# Patient Record
Sex: FEMALE | Race: WHITE | NOT HISPANIC OR LATINO | URBAN - NONMETROPOLITAN AREA
[De-identification: names, ages, dates, MRNs, and addresses within clinical notes are randomized per-mention and may not be internally consistent; named-entity substitution may affect disease eponyms.]

---

## 2020-10-16 ENCOUNTER — OFFICE VISIT (OUTPATIENT)
Dept: URBAN - NONMETROPOLITAN AREA CLINIC 13 | Facility: CLINIC | Age: 30
End: 2020-10-16
Payer: COMMERCIAL

## 2020-10-16 ENCOUNTER — LAB OUTSIDE AN ENCOUNTER (OUTPATIENT)
Dept: URBAN - NONMETROPOLITAN AREA CLINIC 13 | Facility: CLINIC | Age: 30
End: 2020-10-16

## 2020-10-16 VITALS
HEART RATE: 120 BPM | HEIGHT: 64 IN | WEIGHT: 200 LBS | DIASTOLIC BLOOD PRESSURE: 83 MMHG | TEMPERATURE: 97.5 F | SYSTOLIC BLOOD PRESSURE: 115 MMHG | BODY MASS INDEX: 34.15 KG/M2

## 2020-10-16 DIAGNOSIS — K59.01 CONSTIPATION: ICD-10-CM

## 2020-10-16 DIAGNOSIS — K21.9 GERD: ICD-10-CM

## 2020-10-16 PROCEDURE — 99244 OFF/OP CNSLTJ NEW/EST MOD 40: CPT | Performed by: INTERNAL MEDICINE

## 2020-10-16 PROCEDURE — G8482 FLU IMMUNIZE ORDER/ADMIN: HCPCS | Performed by: INTERNAL MEDICINE

## 2020-10-16 RX ORDER — PRUCALOPRIDE 2 MG/1
1 TABLET TABLET, FILM COATED ORAL ONCE A DAY
Qty: 90 TABLET | Refills: 3 | OUTPATIENT
Start: 2020-10-16 | End: 2021-10-11

## 2020-10-16 RX ORDER — BACLOFEN 10 MG/1
1 TABLET WITH FOOD OR MILK TABLET ORAL THREE TIMES A DAY
Status: ACTIVE | COMMUNITY

## 2020-10-16 RX ORDER — ETONOGESTREL AND ETHINYL ESTRADIOL .12; .015 MG/D; MG/D
1 RING LEAVE IN PLACE FOR 3 WEEKS, REMOVE, AND REPLACE WITH A NEW RING AFTER 7 DAY BREAK INSERT, EXTENDED RELEASE VAGINAL
Status: ACTIVE | COMMUNITY

## 2020-10-16 RX ORDER — CHOLECALCIFEROL (VITAMIN D3) 125 MCG
AS DIRECTED CAPSULE ORAL
Status: ACTIVE | COMMUNITY

## 2020-10-16 RX ORDER — LINACLOTIDE 145 UG/1
1 CAPSULE AT LEAST 30 MINUTES BEFORE THE FIRST MEAL OF THE DAY ON AN EMPTY STOMACH CAPSULE, GELATIN COATED ORAL ONCE A DAY
Status: ACTIVE | COMMUNITY

## 2020-10-16 RX ORDER — SODIUM, POTASSIUM,MAG SULFATES 17.5-3.13G
354 ML AS DIRECTED SOLUTION, RECONSTITUTED, ORAL ORAL ONCE
Qty: 354 MILLILITER | Refills: 0 | OUTPATIENT
Start: 2020-10-16 | End: 2020-10-17

## 2020-10-16 RX ORDER — PANTOPRAZOLE SODIUM 40 MG/1
1 TABLET TABLET, DELAYED RELEASE ORAL ONCE A DAY
Status: ACTIVE | COMMUNITY

## 2020-10-16 RX ORDER — SERTRALINE 50 MG/1
1 TABLET TABLET, FILM COATED ORAL ONCE A DAY
Status: ACTIVE | COMMUNITY

## 2020-10-16 RX ORDER — DEXTROAMPHETAMINE SACCHARATE, AMPHETAMINE ASPARTATE, DEXTROAMPHETAMINE SULFATE, AND AMPHETAMINE SULFATE 7.5; 7.5; 7.5; 7.5 MG/1; MG/1; MG/1; MG/1
1 TABLET TABLET ORAL TWICE A DAY
Status: ACTIVE | COMMUNITY

## 2020-10-16 NOTE — HPI-TODAY'S VISIT:
Adina is a 30-year-old female who presents for evaluation of chronic constipation.  She has had symptoms for some time now.  She has been on Adderall since her youth and recently on baclofen for leg cramps.  Over the past year she has been on Linzess 145 mcg daily.  She has up to 3-10 bowel movements with incomplete evacuation along with tenesmus.  She denies any mucus or nocturnal symptoms, she does have occasional bright red blood per rectum.  She denies any weight loss.  She is recently diagnosed with Covid and has been taking ibuprofen.  She has had increased reflux and heartburn recently.  Today her main complaint is constipation along with tenesmus.  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

## 2020-10-22 LAB
H. PYLORI STOOL AG, EIA: (no result)
H. PYLORI, IGG ABS: 0.1
REQUEST PROBLEM: (no result)

## 2020-11-11 ENCOUNTER — TELEPHONE ENCOUNTER (OUTPATIENT)
Dept: URBAN - NONMETROPOLITAN AREA CLINIC 2 | Facility: CLINIC | Age: 30
End: 2020-11-11

## 2020-11-11 RX ORDER — SODIUM, POTASSIUM,MAG SULFATES 17.5-3.13G
354 ML AS DIRECTED SOLUTION, RECONSTITUTED, ORAL ORAL ONCE
Qty: 354 MILLILITER | Refills: 0 | OUTPATIENT
Start: 2020-11-11 | End: 2020-11-12

## 2020-11-11 RX ORDER — PRUCALOPRIDE 2 MG/1
1 TABLET TABLET, FILM COATED ORAL ONCE A DAY
Qty: 90 TABLET | Refills: 3 | Status: ACTIVE | COMMUNITY
Start: 2020-10-16 | End: 2021-10-11

## 2020-11-11 RX ORDER — DEXTROAMPHETAMINE SACCHARATE, AMPHETAMINE ASPARTATE, DEXTROAMPHETAMINE SULFATE, AND AMPHETAMINE SULFATE 7.5; 7.5; 7.5; 7.5 MG/1; MG/1; MG/1; MG/1
1 TABLET TABLET ORAL TWICE A DAY
Status: ACTIVE | COMMUNITY

## 2020-11-11 RX ORDER — LINACLOTIDE 145 UG/1
1 CAPSULE AT LEAST 30 MINUTES BEFORE THE FIRST MEAL OF THE DAY ON AN EMPTY STOMACH CAPSULE, GELATIN COATED ORAL ONCE A DAY
Status: ACTIVE | COMMUNITY

## 2020-11-11 RX ORDER — PANTOPRAZOLE SODIUM 40 MG/1
1 TABLET TABLET, DELAYED RELEASE ORAL ONCE A DAY
Status: ACTIVE | COMMUNITY

## 2020-11-11 RX ORDER — BACLOFEN 10 MG/1
1 TABLET WITH FOOD OR MILK TABLET ORAL THREE TIMES A DAY
Status: ACTIVE | COMMUNITY

## 2020-11-11 RX ORDER — ETONOGESTREL AND ETHINYL ESTRADIOL .12; .015 MG/D; MG/D
1 RING LEAVE IN PLACE FOR 3 WEEKS, REMOVE, AND REPLACE WITH A NEW RING AFTER 7 DAY BREAK INSERT, EXTENDED RELEASE VAGINAL
Status: ACTIVE | COMMUNITY

## 2020-11-11 RX ORDER — SERTRALINE 50 MG/1
1 TABLET TABLET, FILM COATED ORAL ONCE A DAY
Status: ACTIVE | COMMUNITY

## 2020-11-11 RX ORDER — CHOLECALCIFEROL (VITAMIN D3) 125 MCG
AS DIRECTED CAPSULE ORAL
Status: ACTIVE | COMMUNITY

## 2020-11-16 ENCOUNTER — OFFICE VISIT (OUTPATIENT)
Dept: URBAN - NONMETROPOLITAN AREA SURGERY CENTER 1 | Facility: SURGERY CENTER | Age: 30
End: 2020-11-16
Payer: COMMERCIAL

## 2020-11-16 DIAGNOSIS — K59.09 CHRONIC CONSTIPATION: ICD-10-CM

## 2020-11-16 PROCEDURE — G8907 PT DOC NO EVENTS ON DISCHARG: HCPCS | Performed by: INTERNAL MEDICINE

## 2020-11-16 PROCEDURE — 45378 DIAGNOSTIC COLONOSCOPY: CPT | Performed by: INTERNAL MEDICINE

## 2020-11-16 PROCEDURE — G9937 DIG OR SURV COLSCO: HCPCS | Performed by: INTERNAL MEDICINE

## 2020-12-18 ENCOUNTER — OFFICE VISIT (OUTPATIENT)
Dept: URBAN - NONMETROPOLITAN AREA CLINIC 13 | Facility: CLINIC | Age: 30
End: 2020-12-18
Payer: COMMERCIAL

## 2020-12-18 ENCOUNTER — OFFICE VISIT (OUTPATIENT)
Dept: URBAN - NONMETROPOLITAN AREA CLINIC 13 | Facility: CLINIC | Age: 30
End: 2020-12-18

## 2020-12-18 ENCOUNTER — WEB ENCOUNTER (OUTPATIENT)
Dept: URBAN - NONMETROPOLITAN AREA CLINIC 13 | Facility: CLINIC | Age: 30
End: 2020-12-18

## 2020-12-18 VITALS
BODY MASS INDEX: 34.15 KG/M2 | TEMPERATURE: 98 F | WEIGHT: 200 LBS | HEART RATE: 97 BPM | HEIGHT: 64 IN | DIASTOLIC BLOOD PRESSURE: 72 MMHG | SYSTOLIC BLOOD PRESSURE: 128 MMHG

## 2020-12-18 DIAGNOSIS — K59.01 CONSTIPATION: ICD-10-CM

## 2020-12-18 DIAGNOSIS — K21.9 GERD: ICD-10-CM

## 2020-12-18 DIAGNOSIS — Z12.11 COLON CANCER SCREENING: ICD-10-CM

## 2020-12-18 PROCEDURE — 1036F TOBACCO NON-USER: CPT | Performed by: INTERNAL MEDICINE

## 2020-12-18 PROCEDURE — G8482 FLU IMMUNIZE ORDER/ADMIN: HCPCS | Performed by: INTERNAL MEDICINE

## 2020-12-18 PROCEDURE — G9903 PT SCRN TBCO ID AS NON USER: HCPCS | Performed by: INTERNAL MEDICINE

## 2020-12-18 PROCEDURE — G8417 CALC BMI ABV UP PARAM F/U: HCPCS | Performed by: INTERNAL MEDICINE

## 2020-12-18 PROCEDURE — 99213 OFFICE O/P EST LOW 20 MIN: CPT | Performed by: INTERNAL MEDICINE

## 2020-12-18 PROCEDURE — G8427 DOCREV CUR MEDS BY ELIG CLIN: HCPCS | Performed by: INTERNAL MEDICINE

## 2020-12-18 RX ORDER — PANTOPRAZOLE SODIUM 40 MG/1
1 TABLET TABLET, DELAYED RELEASE ORAL ONCE A DAY
Status: ACTIVE | COMMUNITY

## 2020-12-18 RX ORDER — ETONOGESTREL AND ETHINYL ESTRADIOL .12; .015 MG/D; MG/D
1 RING LEAVE IN PLACE FOR 3 WEEKS, REMOVE, AND REPLACE WITH A NEW RING AFTER 7 DAY BREAK INSERT, EXTENDED RELEASE VAGINAL
Status: ACTIVE | COMMUNITY

## 2020-12-18 RX ORDER — DEXTROAMPHETAMINE SACCHARATE, AMPHETAMINE ASPARTATE, DEXTROAMPHETAMINE SULFATE, AND AMPHETAMINE SULFATE 7.5; 7.5; 7.5; 7.5 MG/1; MG/1; MG/1; MG/1
1 TABLET TABLET ORAL TWICE A DAY
Status: ACTIVE | COMMUNITY

## 2020-12-18 RX ORDER — CHOLECALCIFEROL (VITAMIN D3) 125 MCG
AS DIRECTED CAPSULE ORAL
Status: ACTIVE | COMMUNITY

## 2020-12-18 RX ORDER — SERTRALINE 50 MG/1
1 TABLET TABLET, FILM COATED ORAL ONCE A DAY
Status: ACTIVE | COMMUNITY

## 2020-12-18 RX ORDER — BACLOFEN 10 MG/1
1 TABLET WITH FOOD OR MILK TABLET ORAL THREE TIMES A DAY
Status: ACTIVE | COMMUNITY

## 2020-12-18 RX ORDER — PRUCALOPRIDE 2 MG/1
1 TABLET TABLET, FILM COATED ORAL ONCE A DAY
Qty: 90 TABLET | Refills: 3 | Status: ACTIVE | COMMUNITY
Start: 2020-10-16 | End: 2021-10-11

## 2020-12-18 RX ORDER — LINACLOTIDE 145 UG/1
1 CAPSULE AT LEAST 30 MINUTES BEFORE THE FIRST MEAL OF THE DAY ON AN EMPTY STOMACH CAPSULE, GELATIN COATED ORAL ONCE A DAY
Status: ACTIVE | COMMUNITY

## 2020-12-18 NOTE — HPI-TODAY'S VISIT:
Adina is a 30-year-old female who presents for evaluation of chronic constipation.  She has had symptoms for some time now.  She has been on Adderall since her youth and recently on baclofen for leg cramps.  Over the past year she has been on Linzess 145 mcg daily.  She has up to 3-10 bowel movements with incomplete evacuation along with tenesmus.  She denies any mucus or nocturnal symptoms, she does have occasional bright red blood per rectum.  She denies any weight loss.  She is recently diagnosed with Covid and has been taking ibuprofen.  She has had increased reflux and heartburn recently.  Today her main complaint is constipation along with tenesmus.  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 Since her last visit her colonoscopy was normal. She can't afford the Motegrity it is over 200$ a month. Linzess 145mcg gave her diarrhea. She would like to try the amitizia. MB

## 2021-01-15 ENCOUNTER — TELEPHONE ENCOUNTER (OUTPATIENT)
Dept: URBAN - METROPOLITAN AREA CLINIC 115 | Facility: CLINIC | Age: 31
End: 2021-01-15

## 2021-01-15 RX ORDER — BACLOFEN 10 MG/1
1 TABLET WITH FOOD OR MILK TABLET ORAL THREE TIMES A DAY
Status: ACTIVE | COMMUNITY

## 2021-01-15 RX ORDER — LINACLOTIDE 145 UG/1
1 CAPSULE AT LEAST 30 MINUTES BEFORE THE FIRST MEAL OF THE DAY ON AN EMPTY STOMACH CAPSULE, GELATIN COATED ORAL ONCE A DAY
Status: ACTIVE | COMMUNITY

## 2021-01-15 RX ORDER — SERTRALINE 50 MG/1
1 TABLET TABLET, FILM COATED ORAL ONCE A DAY
Status: ACTIVE | COMMUNITY

## 2021-01-15 RX ORDER — ETONOGESTREL AND ETHINYL ESTRADIOL .12; .015 MG/D; MG/D
1 RING LEAVE IN PLACE FOR 3 WEEKS, REMOVE, AND REPLACE WITH A NEW RING AFTER 7 DAY BREAK INSERT, EXTENDED RELEASE VAGINAL
Status: ACTIVE | COMMUNITY

## 2021-01-15 RX ORDER — CHOLECALCIFEROL (VITAMIN D3) 125 MCG
AS DIRECTED CAPSULE ORAL
Status: ACTIVE | COMMUNITY

## 2021-01-15 RX ORDER — PANTOPRAZOLE SODIUM 40 MG/1
1 TABLET TABLET, DELAYED RELEASE ORAL ONCE A DAY
Status: ACTIVE | COMMUNITY

## 2021-01-15 RX ORDER — PRUCALOPRIDE 2 MG/1
1 TABLET TABLET, FILM COATED ORAL ONCE A DAY
Qty: 90 TABLET | Refills: 3 | Status: ACTIVE | COMMUNITY
Start: 2020-10-16 | End: 2021-10-11

## 2021-01-15 RX ORDER — LUBIPROSTONE 24 UG/1
TAKE 1 CAPSULE (8 MCG) BY ORAL ROUTE 2 TIMES PER DAY WITH FOOD AND WATER CAPSULE, GELATIN COATED ORAL 2
Qty: 180 CAPSULES | Refills: 3 | OUTPATIENT
Start: 2021-01-15 | End: 2022-01-10

## 2021-01-15 RX ORDER — DEXTROAMPHETAMINE SACCHARATE, AMPHETAMINE ASPARTATE, DEXTROAMPHETAMINE SULFATE, AND AMPHETAMINE SULFATE 7.5; 7.5; 7.5; 7.5 MG/1; MG/1; MG/1; MG/1
1 TABLET TABLET ORAL TWICE A DAY
Status: ACTIVE | COMMUNITY

## 2021-03-19 ENCOUNTER — OFFICE VISIT (OUTPATIENT)
Dept: URBAN - NONMETROPOLITAN AREA CLINIC 13 | Facility: CLINIC | Age: 31
End: 2021-03-19
Payer: COMMERCIAL

## 2021-03-19 DIAGNOSIS — K59.01 CONSTIPATION: ICD-10-CM

## 2021-03-19 DIAGNOSIS — Z12.11 COLON CANCER SCREENING: ICD-10-CM

## 2021-03-19 DIAGNOSIS — K21.9 GERD: ICD-10-CM

## 2021-03-19 PROCEDURE — 99213 OFFICE O/P EST LOW 20 MIN: CPT | Performed by: NURSE PRACTITIONER

## 2021-03-19 RX ORDER — CHOLECALCIFEROL (VITAMIN D3) 125 MCG
AS DIRECTED CAPSULE ORAL
Status: ACTIVE | COMMUNITY

## 2021-03-19 RX ORDER — LINACLOTIDE 145 UG/1
1 CAPSULE AT LEAST 30 MINUTES BEFORE THE FIRST MEAL OF THE DAY ON AN EMPTY STOMACH CAPSULE, GELATIN COATED ORAL ONCE A DAY
Status: ACTIVE | COMMUNITY

## 2021-03-19 RX ORDER — ETONOGESTREL AND ETHINYL ESTRADIOL .12; .015 MG/D; MG/D
1 RING LEAVE IN PLACE FOR 3 WEEKS, REMOVE, AND REPLACE WITH A NEW RING AFTER 7 DAY BREAK INSERT, EXTENDED RELEASE VAGINAL
Status: ACTIVE | COMMUNITY

## 2021-03-19 RX ORDER — SERTRALINE 50 MG/1
1 TABLET TABLET, FILM COATED ORAL ONCE A DAY
Status: ACTIVE | COMMUNITY

## 2021-03-19 RX ORDER — BACLOFEN 10 MG/1
1 TABLET WITH FOOD OR MILK TABLET ORAL THREE TIMES A DAY
Status: ACTIVE | COMMUNITY

## 2021-03-19 RX ORDER — PRUCALOPRIDE 2 MG/1
1 TABLET TABLET, FILM COATED ORAL ONCE A DAY
Qty: 90 TABLET | Refills: 3 | Status: ACTIVE | COMMUNITY
Start: 2020-10-16 | End: 2021-10-11

## 2021-03-19 RX ORDER — PANTOPRAZOLE SODIUM 40 MG/1
1 TABLET TABLET, DELAYED RELEASE ORAL ONCE A DAY
Status: ACTIVE | COMMUNITY

## 2021-03-19 RX ORDER — LUBIPROSTONE 24 UG/1
TAKE 1 CAPSULE (8 MCG) BY ORAL ROUTE 2 TIMES PER DAY WITH FOOD AND WATER CAPSULE, GELATIN COATED ORAL 2
Qty: 180 CAPSULES | Refills: 3 | Status: ACTIVE | COMMUNITY
Start: 2021-01-15 | End: 2022-01-10

## 2021-03-19 RX ORDER — DEXTROAMPHETAMINE SACCHARATE, AMPHETAMINE ASPARTATE, DEXTROAMPHETAMINE SULFATE, AND AMPHETAMINE SULFATE 7.5; 7.5; 7.5; 7.5 MG/1; MG/1; MG/1; MG/1
1 TABLET TABLET ORAL TWICE A DAY
Status: ACTIVE | COMMUNITY

## 2021-03-19 NOTE — HPI-TODAY'S VISIT:
Adina is a 30-year-old female who presents for evaluation of chronic constipation.  She has had symptoms for some time now.  She has been on Adderall since her youth and recently on baclofen for leg cramps.  Over the past year she has been on Linzess 145 mcg daily.  She has up to 3-10 bowel movements with incomplete evacuation along with tenesmus.  She denies any mucus or nocturnal symptoms, she does have occasional bright red blood per rectum.  She denies any weight loss.  She is recently diagnosed with Covid and has been taking ibuprofen.  She has had increased reflux and heartburn recently.  Today her main complaint is constipation along with tenesmus.  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 Since her last visit her colonoscopy was normal. She can't afford the Motegrity it is over 200$ a month. Linzess 145mcg gave her diarrhea. She would like to try the amitizia. MB  3/19/2021 Adina presents for follow-up of constipation.  Since her last visit she started Amitiza 8 mcg twice daily.  She is having a good bowel movement daily.  She still has some abdominal cramping and is following with her gynecologist with likely endometriosis as well.  Today we had a long discussion regarding endometriosis and functional bowel disease.  She likely has symptoms of both.  I have recommended IBgard as needed for spasm.  Today she is doing well otherwise.  MB

## 2021-06-22 ENCOUNTER — WEB ENCOUNTER (OUTPATIENT)
Dept: URBAN - NONMETROPOLITAN AREA CLINIC 13 | Facility: CLINIC | Age: 31
End: 2021-06-22

## 2021-06-24 ENCOUNTER — OFFICE VISIT (OUTPATIENT)
Dept: URBAN - NONMETROPOLITAN AREA CLINIC 13 | Facility: CLINIC | Age: 31
End: 2021-06-24
Payer: COMMERCIAL

## 2021-06-24 ENCOUNTER — WEB ENCOUNTER (OUTPATIENT)
Dept: URBAN - NONMETROPOLITAN AREA CLINIC 13 | Facility: CLINIC | Age: 31
End: 2021-06-24

## 2021-06-24 VITALS
DIASTOLIC BLOOD PRESSURE: 86 MMHG | HEIGHT: 64 IN | BODY MASS INDEX: 33.6 KG/M2 | TEMPERATURE: 97.7 F | HEART RATE: 97 BPM | WEIGHT: 196.8 LBS | SYSTOLIC BLOOD PRESSURE: 127 MMHG

## 2021-06-24 DIAGNOSIS — K21.9 GERD: ICD-10-CM

## 2021-06-24 DIAGNOSIS — K59.01 CONSTIPATION: ICD-10-CM

## 2021-06-24 DIAGNOSIS — Z12.11 COLON CANCER SCREENING: ICD-10-CM

## 2021-06-24 DIAGNOSIS — R10.32 LLQ ABDOMINAL PAIN: ICD-10-CM

## 2021-06-24 PROCEDURE — 99214 OFFICE O/P EST MOD 30 MIN: CPT | Performed by: INTERNAL MEDICINE

## 2021-06-24 RX ORDER — CHOLECALCIFEROL (VITAMIN D3) 125 MCG
AS DIRECTED CAPSULE ORAL
Status: ACTIVE | COMMUNITY

## 2021-06-24 RX ORDER — PANTOPRAZOLE SODIUM 40 MG/1
1 TABLET TABLET, DELAYED RELEASE ORAL ONCE A DAY
Status: ACTIVE | COMMUNITY

## 2021-06-24 RX ORDER — LUBIPROSTONE 24 UG/1
TAKE 1 CAPSULE (8 MCG) BY ORAL ROUTE 2 TIMES PER DAY WITH FOOD AND WATER CAPSULE, GELATIN COATED ORAL 2
Qty: 180 CAPSULES | Refills: 3 | Status: ACTIVE | COMMUNITY
Start: 2021-01-15 | End: 2022-01-10

## 2021-06-24 RX ORDER — LINACLOTIDE 145 UG/1
1 CAPSULE AT LEAST 30 MINUTES BEFORE THE FIRST MEAL OF THE DAY ON AN EMPTY STOMACH CAPSULE, GELATIN COATED ORAL ONCE A DAY
Status: ACTIVE | COMMUNITY

## 2021-06-24 RX ORDER — PRUCALOPRIDE 2 MG/1
1 TABLET TABLET, FILM COATED ORAL ONCE A DAY
Qty: 90 TABLET | Refills: 3 | Status: ACTIVE | COMMUNITY
Start: 2020-10-16 | End: 2021-10-11

## 2021-06-24 RX ORDER — SERTRALINE 50 MG/1
1 TABLET TABLET, FILM COATED ORAL ONCE A DAY
Status: ACTIVE | COMMUNITY

## 2021-06-24 RX ORDER — BACLOFEN 10 MG/1
1 TABLET WITH FOOD OR MILK TABLET ORAL THREE TIMES A DAY
Status: ACTIVE | COMMUNITY

## 2021-06-24 RX ORDER — ETONOGESTREL AND ETHINYL ESTRADIOL .12; .015 MG/D; MG/D
1 RING LEAVE IN PLACE FOR 3 WEEKS, REMOVE, AND REPLACE WITH A NEW RING AFTER 7 DAY BREAK INSERT, EXTENDED RELEASE VAGINAL
Status: ACTIVE | COMMUNITY

## 2021-06-24 RX ORDER — DEXTROAMPHETAMINE SACCHARATE, AMPHETAMINE ASPARTATE, DEXTROAMPHETAMINE SULFATE, AND AMPHETAMINE SULFATE 7.5; 7.5; 7.5; 7.5 MG/1; MG/1; MG/1; MG/1
1 TABLET TABLET ORAL TWICE A DAY
Status: ACTIVE | COMMUNITY

## 2021-06-24 NOTE — HPI-TODAY'S VISIT:
Adina is a 30-year-old female who presents for evaluation of chronic constipation.  She has had symptoms for some time now.  She has been on Adderall since her youth and recently on baclofen for leg cramps.  Over the past year she has been on Linzess 145 mcg daily.  She has up to 3-10 bowel movements with incomplete evacuation along with tenesmus.  She denies any mucus or nocturnal symptoms, she does have occasional bright red blood per rectum.  She denies any weight loss.  She is recently diagnosed with Covid and has been taking ibuprofen.  She has had increased reflux and heartburn recently.  Today her main complaint is constipation along with tenesmus.  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 Since her last visit her colonoscopy was normal. She can't afford the Motegrity it is over 200$ a month. Linzess 145mcg gave her diarrhea. She would like to try the amitizia. MB  3/19/2021 Adina presents for follow-up of constipation.  Since her last visit she started Amitiza 8 mcg twice daily.  She is having a good bowel movement daily.  She still has some abdominal cramping and is following with her gynecologist with likely endometriosis as well.  Today we had a long discussion regarding endometriosis and functional bowel disease.  She likely has symptoms of both.  I have recommended IBgard as needed for spasm.  Today she is doing well otherwise.  MB 6/24/2021 The patient presents today for follow-up of her constipation and abdominal pain.  Since her last visit, her abdominal pain has become significantly worse.  She states she is going to the bathroom all morning long.  She has cramping abdominal pain with the Amitiza.  She is also been seen by her OB/GYN, and her hormones have been changed due to her endometriosis.  She was referred back here to see if we could change her bowel regimen to help with her abdominal pain.  We have had a long discussion about stopping the Amitiza and starting MiraLAX and Metamucil.  She states she is having clear watery bowel movements 10-12 times a day.  She continues to have take pain whether or not she takes Amitiza or not.  She is unsure if the constipation is contributing to her pain or if it is endometriosis.  At this point, we have discussed changing her bowel regimen, and if symptoms do not improve, then proceeding with further imaging.  The patient is in agreement with this plan.

## 2021-07-07 ENCOUNTER — WEB ENCOUNTER (OUTPATIENT)
Dept: URBAN - NONMETROPOLITAN AREA CLINIC 13 | Facility: CLINIC | Age: 31
End: 2021-07-07

## 2021-07-07 ENCOUNTER — LAB OUTSIDE AN ENCOUNTER (OUTPATIENT)
Dept: URBAN - NONMETROPOLITAN AREA CLINIC 13 | Facility: CLINIC | Age: 31
End: 2021-07-07

## 2021-07-13 ENCOUNTER — WEB ENCOUNTER (OUTPATIENT)
Dept: URBAN - NONMETROPOLITAN AREA CLINIC 2 | Facility: CLINIC | Age: 31
End: 2021-07-13

## 2021-07-13 ENCOUNTER — DASHBOARD ENCOUNTERS (OUTPATIENT)
Age: 31
End: 2021-07-13

## 2021-07-13 ENCOUNTER — OFFICE VISIT (OUTPATIENT)
Dept: URBAN - NONMETROPOLITAN AREA CLINIC 2 | Facility: CLINIC | Age: 31
End: 2021-07-13
Payer: COMMERCIAL

## 2021-07-13 VITALS
TEMPERATURE: 97.8 F | HEIGHT: 64 IN | SYSTOLIC BLOOD PRESSURE: 124 MMHG | HEART RATE: 86 BPM | WEIGHT: 200 LBS | BODY MASS INDEX: 34.15 KG/M2 | DIASTOLIC BLOOD PRESSURE: 86 MMHG

## 2021-07-13 DIAGNOSIS — K21.9 GERD: ICD-10-CM

## 2021-07-13 DIAGNOSIS — K58.2 IRRITABLE BOWEL SYNDROME WITH BOTH CONSTIPATION AND DIARRHEA: ICD-10-CM

## 2021-07-13 DIAGNOSIS — Z12.11 COLON CANCER SCREENING: ICD-10-CM

## 2021-07-13 DIAGNOSIS — R10.32 LLQ ABDOMINAL PAIN: ICD-10-CM

## 2021-07-13 PROBLEM — 235595009 GASTROESOPHAGEAL REFLUX DISEASE: Status: ACTIVE | Noted: 2020-10-16

## 2021-07-13 PROBLEM — 301716002: Status: ACTIVE | Noted: 2021-06-24

## 2021-07-13 PROBLEM — 10743008: Status: ACTIVE | Noted: 2021-07-13

## 2021-07-13 PROBLEM — 275978004 COLON CANCER SCREENING: Status: ACTIVE | Noted: 2020-12-18

## 2021-07-13 PROCEDURE — 99214 OFFICE O/P EST MOD 30 MIN: CPT | Performed by: INTERNAL MEDICINE

## 2021-07-13 RX ORDER — PRUCALOPRIDE 2 MG/1
1 TABLET TABLET, FILM COATED ORAL ONCE A DAY
Qty: 90 TABLET | Refills: 3 | Status: ON HOLD | COMMUNITY
Start: 2020-10-16 | End: 2021-10-11

## 2021-07-13 RX ORDER — RIFAXIMIN 550 MG/1
1 TABLET TABLET ORAL THREE TIMES A DAY
Qty: 42 TABLET | Refills: 2 | OUTPATIENT
Start: 2021-07-13 | End: 2021-08-24

## 2021-07-13 RX ORDER — DEXTROAMPHETAMINE SACCHARATE, AMPHETAMINE ASPARTATE, DEXTROAMPHETAMINE SULFATE, AND AMPHETAMINE SULFATE 7.5; 7.5; 7.5; 7.5 MG/1; MG/1; MG/1; MG/1
1 TABLET TABLET ORAL TWICE A DAY
Status: ACTIVE | COMMUNITY

## 2021-07-13 RX ORDER — BACLOFEN 10 MG/1
1 TABLET WITH FOOD OR MILK TABLET ORAL THREE TIMES A DAY
Status: ACTIVE | COMMUNITY

## 2021-07-13 RX ORDER — SERTRALINE 50 MG/1
1 TABLET TABLET, FILM COATED ORAL ONCE A DAY
Status: ACTIVE | COMMUNITY

## 2021-07-13 RX ORDER — LINACLOTIDE 145 UG/1
1 CAPSULE AT LEAST 30 MINUTES BEFORE THE FIRST MEAL OF THE DAY ON AN EMPTY STOMACH CAPSULE, GELATIN COATED ORAL ONCE A DAY
Status: ON HOLD | COMMUNITY

## 2021-07-13 RX ORDER — CHOLECALCIFEROL (VITAMIN D3) 125 MCG
AS DIRECTED CAPSULE ORAL
Status: ACTIVE | COMMUNITY

## 2021-07-13 RX ORDER — PANTOPRAZOLE SODIUM 40 MG/1
1 TABLET TABLET, DELAYED RELEASE ORAL ONCE A DAY
Status: ACTIVE | COMMUNITY

## 2021-07-13 RX ORDER — LUBIPROSTONE 24 UG/1
TAKE 1 CAPSULE (8 MCG) BY ORAL ROUTE 2 TIMES PER DAY WITH FOOD AND WATER CAPSULE, GELATIN COATED ORAL 2
Qty: 180 CAPSULES | Refills: 3 | Status: ON HOLD | COMMUNITY
Start: 2021-01-15 | End: 2022-01-10

## 2021-07-13 RX ORDER — ETONOGESTREL AND ETHINYL ESTRADIOL .12; .015 MG/D; MG/D
1 RING LEAVE IN PLACE FOR 3 WEEKS, REMOVE, AND REPLACE WITH A NEW RING AFTER 7 DAY BREAK INSERT, EXTENDED RELEASE VAGINAL
Status: ACTIVE | COMMUNITY

## 2021-07-13 NOTE — HPI-TODAY'S VISIT:
Adina is a 30-year-old female who presents for evaluation of chronic constipation.  She has had symptoms for some time now.  She has been on Adderall since her youth and recently on baclofen for leg cramps.  Over the past year she has been on Linzess 145 mcg daily.  She has up to 3-10 bowel movements with incomplete evacuation along with tenesmus.  She denies any mucus or nocturnal symptoms, she does have occasional bright red blood per rectum.  She denies any weight loss.  She is recently diagnosed with Covid and has been taking ibuprofen.  She has had increased reflux and heartburn recently.  Today her main complaint is constipation along with tenesmus.  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 Since her last visit her colonoscopy was normal. She can't afford the Motegrity it is over 200$ a month. Linzess 145mcg gave her diarrhea. She would like to try the amitizia. MB  3/19/2021 Adina presents for follow-up of constipation.  Since her last visit she started Amitiza 8 mcg twice daily.  She is having a good bowel movement daily.  She still has some abdominal cramping and is following with her gynecologist with likely endometriosis as well.  Today we had a long discussion regarding endometriosis and functional bowel disease.  She likely has symptoms of both.  I have recommended IBgard as needed for spasm.  Today she is doing well otherwise.  MB  6/24/2021 The patient presents today for follow-up of her constipation and abdominal pain.  Since her last visit, her abdominal pain has become significantly worse.  She states she is going to the bathroom all morning long.  She has cramping abdominal pain with the Amitiza.  She is also been seen by her OB/GYN, and her hormones have been changed due to her endometriosis.  She was referred back here to see if we could change her bowel regimen to help with her abdominal pain.  We have had a long discussion about stopping the Amitiza and starting MiraLAX and Metamucil.  She states she is having clear watery bowel movements 10-12 times a day.  She continues to have take pain whether or not she takes Amitiza or not.  She is unsure if the constipation is contributing to her pain or if it is endometriosis.  At this point, we have discussed changing her bowel regimen, and if symptoms do not improve, then proceeding with further imaging.  The patient is in agreement with this plan.   7/13/2021 Adina presents for follow-up of IBS mixed.  She continues to have left lower quadrant abdominal discomfort with bowel irregularity with alternating constipation and diarrhea.  Her reflux is stable on Protonix 40 mg daily.  Since her last visit her symptoms did not improve on the MiraLAX and Metamucil.  She is using IBgard as needed with no improvement.  She had a contrasted CT scan done on July 12 with no etiology or inflammatory findings.  Today we had a long discussion regarding functional disease and likely a component of endometriosis.  She agrees to try Xifaxan and we will consider amitriptyline if no improvement.  MB

## 2021-08-20 ENCOUNTER — OFFICE VISIT (OUTPATIENT)
Dept: URBAN - NONMETROPOLITAN AREA CLINIC 13 | Facility: CLINIC | Age: 31
End: 2021-08-20

## 2021-10-12 ENCOUNTER — OFFICE VISIT (OUTPATIENT)
Dept: URBAN - NONMETROPOLITAN AREA CLINIC 2 | Facility: CLINIC | Age: 31
End: 2021-10-12